# Patient Record
Sex: FEMALE | Race: WHITE | NOT HISPANIC OR LATINO | Employment: STUDENT | URBAN - METROPOLITAN AREA
[De-identification: names, ages, dates, MRNs, and addresses within clinical notes are randomized per-mention and may not be internally consistent; named-entity substitution may affect disease eponyms.]

---
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
## 2025-07-19 ENCOUNTER — OFFICE VISIT (OUTPATIENT)
Dept: URGENT CARE | Age: 20
End: 2025-07-19
Payer: COMMERCIAL

## 2025-07-19 VITALS
SYSTOLIC BLOOD PRESSURE: 120 MMHG | TEMPERATURE: 98.3 F | OXYGEN SATURATION: 99 % | RESPIRATION RATE: 16 BRPM | DIASTOLIC BLOOD PRESSURE: 84 MMHG | HEART RATE: 82 BPM

## 2025-07-19 DIAGNOSIS — J02.9 SORE THROAT: Primary | ICD-10-CM

## 2025-07-19 LAB — POC RAPID STREP: NEGATIVE

## 2025-07-19 RX ORDER — AMOXICILLIN 875 MG/1
875 TABLET, COATED ORAL 2 TIMES DAILY
Qty: 20 TABLET | Refills: 0 | Status: SHIPPED | OUTPATIENT
Start: 2025-07-19 | End: 2025-07-29

## 2025-07-19 RX ORDER — AMOXICILLIN 875 MG/1
875 TABLET, COATED ORAL 2 TIMES DAILY
Qty: 20 TABLET | Refills: 0 | Status: SHIPPED | OUTPATIENT
Start: 2025-07-19 | End: 2025-07-19

## 2025-07-19 ASSESSMENT — ENCOUNTER SYMPTOMS: SORE THROAT: 1

## 2025-07-19 ASSESSMENT — PAIN SCALES - GENERAL: PAINLEVEL_OUTOF10: 6

## 2025-07-19 NOTE — PROGRESS NOTES
Subjective   Patient ID: Tai Botello is a 19 y.o. female. They present today with a chief complaint of Sore Throat and Earache (Bilateral ear pain ).    History of Present Illness    Sore Throat   Associated symptoms include ear pain.   Earache   Associated symptoms include a sore throat.       19-year-old female patient presents today with concerns of sore throat and bilateral earache.  She states that the symptoms have been going on for couple days.  She has not been around any sick contacts that she is aware of.  She denies any difficulties breathing.  Patient states that she is prone to getting strep throat and has had it multiple times as an adult.  She is here for evaluation.  Past Medical History  Allergies as of 07/19/2025    (No Known Allergies)       Prescriptions Prior to Admission[1]     Medical History[2]    Surgical History[3]     reports that she has never smoked. She has never used smokeless tobacco. She reports that she does not drink alcohol and does not use drugs.    Review of Systems  Review of Systems   HENT:  Positive for ear pain and sore throat.                                   Objective    Vitals:    07/19/25 1937   BP: 120/84   Pulse: 82   Resp: 16   Temp: 36.8 °C (98.3 °F)   SpO2: 99%     No LMP recorded (lmp unknown).    Physical Exam  HENT:      Right Ear: Tympanic membrane normal.      Left Ear: Tympanic membrane normal.      Mouth/Throat:      Pharynx: Posterior oropharyngeal erythema present.         Procedures    Point of Care Test & Imaging Results from this visit  No results found for this visit on 07/19/25.   Imaging  No results found.    Cardiology, Vascular, and Other Imaging  No other imaging results found for the past 2 days      Diagnostic study results (if any) were reviewed by MARY ANNE Romero.    Assessment/Plan   Allergies, medications, history, and pertinent labs/EKGs/Imaging reviewed by MARY ANNE Romero.     Medical Decision  Making  Rapid strep completed in clinic; results were discussed with patient.  Patient states that she is leaving for vacation at 8:30 in the morning; antibiotics were given from our in-clinic pharmacy-discussed with patient to start antibiotics if symptoms do not resolve within a couple days.  Patient was agreeable to this plan; medication education provided.    Orders and Diagnoses  Diagnoses and all orders for this visit:  Sore throat  -     POCT rapid strep A manually resulted  -     amoxicillin (Amoxil) 875 mg tablet; Take 1 tablet (875 mg) by mouth 2 times a day for 10 days.      Medical Admin Record      Patient disposition: Home    Electronically signed by MARY ANNE Romero  7:51 PM           [1] (Not in a hospital admission)   [2]   Past Medical History:  Diagnosis Date    Daytime somnolence 12/08/2021    Formatting of this note might be different from the original. Added automatically from request for surgery [id number]    Nasal congestion 11/09/2021    Snoring 12/08/2021    Formatting of this note might be different from the original. Added automatically from request for surgery [id number]   [3]   Past Surgical History:  Procedure Laterality Date    OTHER SURGICAL HISTORY  03/23/2021    No history of surgery